# Patient Record
Sex: MALE | Race: WHITE | NOT HISPANIC OR LATINO
[De-identification: names, ages, dates, MRNs, and addresses within clinical notes are randomized per-mention and may not be internally consistent; named-entity substitution may affect disease eponyms.]

---

## 2021-02-16 ENCOUNTER — APPOINTMENT (OUTPATIENT)
Dept: MRI IMAGING | Facility: CLINIC | Age: 61
End: 2021-02-16
Payer: COMMERCIAL

## 2021-02-16 PROCEDURE — 70553 MRI BRAIN STEM W/O & W/DYE: CPT

## 2021-02-16 PROCEDURE — A9585A: CUSTOM

## 2021-02-16 PROCEDURE — A9585: CPT | Mod: JW

## 2021-05-07 PROBLEM — Z00.00 ENCOUNTER FOR PREVENTIVE HEALTH EXAMINATION: Status: ACTIVE | Noted: 2021-05-07

## 2021-05-28 ENCOUNTER — APPOINTMENT (OUTPATIENT)
Dept: CARDIOLOGY | Facility: CLINIC | Age: 61
End: 2021-05-28
Payer: COMMERCIAL

## 2021-05-28 ENCOUNTER — NON-APPOINTMENT (OUTPATIENT)
Age: 61
End: 2021-05-28

## 2021-05-28 VITALS
TEMPERATURE: 97.6 F | BODY MASS INDEX: 31.1 KG/M2 | WEIGHT: 210 LBS | SYSTOLIC BLOOD PRESSURE: 134 MMHG | HEART RATE: 84 BPM | DIASTOLIC BLOOD PRESSURE: 90 MMHG | RESPIRATION RATE: 18 BRPM | HEIGHT: 69 IN | OXYGEN SATURATION: 96 %

## 2021-05-28 DIAGNOSIS — Z78.9 OTHER SPECIFIED HEALTH STATUS: ICD-10-CM

## 2021-05-28 DIAGNOSIS — F41.9 ANXIETY DISORDER, UNSPECIFIED: ICD-10-CM

## 2021-05-28 DIAGNOSIS — Z82.5 FAMILY HISTORY OF ASTHMA AND OTHER CHRONIC LOWER RESPIRATORY DISEASES: ICD-10-CM

## 2021-05-28 DIAGNOSIS — Z82.49 FAMILY HISTORY OF ISCHEMIC HEART DISEASE AND OTHER DISEASES OF THE CIRCULATORY SYSTEM: ICD-10-CM

## 2021-05-28 PROCEDURE — 93000 ELECTROCARDIOGRAM COMPLETE: CPT

## 2021-05-28 PROCEDURE — 99072 ADDL SUPL MATRL&STAF TM PHE: CPT

## 2021-05-28 PROCEDURE — 99204 OFFICE O/P NEW MOD 45 MIN: CPT

## 2021-05-28 RX ORDER — MELOXICAM 15 MG/1
15 TABLET ORAL
Refills: 0 | Status: DISCONTINUED | COMMUNITY
End: 2021-05-28

## 2021-05-28 NOTE — ASSESSMENT
[FreeTextEntry1] : \par 61-year-old male with history of hyperlipidemia likely familial, elevated blood pressure here to establish outpatient CV care due to uncontrolled hyperlipidemia.\par \par He reports trial of Lipitor and Crestor with Zetia causing joint pains, abdominal pain, right forearm pain.  Had not tried statin without Zetia present at the same time.  \par \par \par PLAN:\par -Start aspirin 81, atorvastatin 20, fenofibrate 145.  Recommend PPI, he will discuss again at follow-up.  Stop NSAIDs.\par -TTE and TST ordered\par -Maintain blood pressure log, will possibly need pharmacologic initiation and results follow-up visit\par -Discussed home sleep study, will rediscuss at follow-up visit.  No snoring.\par -We will repeat lab work in 3 months including A1c\par \par \par Follow up after testing.\par

## 2021-05-28 NOTE — HISTORY OF PRESENT ILLNESS
[FreeTextEntry1] : \par 61-year-old male with history of hyperlipidemia likely familial, elevated blood pressure here to establish outpatient CV care due to uncontrolled hyperlipidemia.\par \par He reports trial of Lipitor and Crestor with Zetia causing joint pains, abdominal pain, right forearm pain.  Had not tried statin without Zetia present at the same time.  Denies angina or dyspnea.  Active.  PND, lower extremity edema.\par \par Blood pressure reading elevated today and in PCP office around 130s over 80s to low 90s.  Does not report prior hypertension, will keep blood pressure log at home until results follow-up to discuss Initiating medication therapy.\par \par \par Lab work 4/27/2021: Grossly normal BMP.  Total cholesterol 340, triglycerides 253, HDL 55, .  CBC 1/20/2021: Grossly normal.  LFTs normal 1/28/2021: TFT normal then too.

## 2021-05-28 NOTE — PHYSICAL EXAM
[Well Developed] : well developed [Well Nourished] : well nourished [No Acute Distress] : no acute distress [Normal Conjunctiva] : normal conjunctiva [Normal Venous Pressure] : normal venous pressure [Normal S1, S2] : normal S1, S2 [No Rub] : no rub [No Gallop] : no gallop [Clear Lung Fields] : clear lung fields [Good Air Entry] : good air entry [No Respiratory Distress] : no respiratory distress  [Soft] : abdomen soft [Non Tender] : non-tender [No Masses/organomegaly] : no masses/organomegaly [Normal Bowel Sounds] : normal bowel sounds [Normal Gait] : normal gait [No Edema] : no edema [No Cyanosis] : no cyanosis [No Clubbing] : no clubbing [No Varicosities] : no varicosities [No Rash] : no rash [No Skin Lesions] : no skin lesions [Moves all extremities] : moves all extremities [No Focal Deficits] : no focal deficits [Normal Speech] : normal speech [Alert and Oriented] : alert and oriented [Normal memory] : normal memory

## 2021-06-10 ENCOUNTER — APPOINTMENT (OUTPATIENT)
Dept: CARDIOLOGY | Facility: CLINIC | Age: 61
End: 2021-06-10
Payer: COMMERCIAL

## 2021-06-10 PROCEDURE — 93306 TTE W/DOPPLER COMPLETE: CPT

## 2021-06-10 PROCEDURE — 93015 CV STRESS TEST SUPVJ I&R: CPT

## 2021-06-10 PROCEDURE — 99072 ADDL SUPL MATRL&STAF TM PHE: CPT

## 2021-07-06 ENCOUNTER — APPOINTMENT (OUTPATIENT)
Dept: CARDIOLOGY | Facility: CLINIC | Age: 61
End: 2021-07-06
Payer: COMMERCIAL

## 2021-07-06 VITALS
HEART RATE: 70 BPM | DIASTOLIC BLOOD PRESSURE: 80 MMHG | TEMPERATURE: 97.3 F | WEIGHT: 210 LBS | SYSTOLIC BLOOD PRESSURE: 130 MMHG | HEIGHT: 69 IN | OXYGEN SATURATION: 95 % | BODY MASS INDEX: 31.1 KG/M2

## 2021-07-06 PROCEDURE — 99072 ADDL SUPL MATRL&STAF TM PHE: CPT

## 2021-07-06 PROCEDURE — 99214 OFFICE O/P EST MOD 30 MIN: CPT

## 2021-07-06 NOTE — HISTORY OF PRESENT ILLNESS
[FreeTextEntry1] : \par 61-year-old male with history of hyperlipidemia likely familial, elevated blood pressure here for results follow up and bp.\par \par On last visit, he reported trial of Lipitor and Crestor with Zetia causing joint pains, abdominal pain, right forearm pain. Had not tried statin without Zetia present at the same time. Plan was to stop nsaid and keep BP log started meds.\par \par since last visit, compliant with medications. tolerated statin/feno therapy off zetia. Patient denies chest pain, shortness of breath, orthopnea, PND, LE edema, syncope, near syncope. did not keep bp log yet. testing done.\par \par \par TST 6/21: 10 mets, no ekg ischemia\par TTE 6/21: grossly unremarkable except calcified NCC no aortic stenosis. asc ao 4.1 normal when indexed to bsa\par Lab work 4/27/2021: Grossly normal BMP.  Total cholesterol 340, triglycerides 253, HDL 55, .  CBC 1/20/2021: Grossly normal.  LFTs normal 1/28/2021: TFT normal then too.

## 2021-07-06 NOTE — ASSESSMENT
[FreeTextEntry1] : \par 61-year-old male with history of hyperlipidemia likely familial, elevated blood pressure here for results follow up and bp. tolerated statin/feno therapy off zetia. did not keep bp log yet. testing done.\par \par \par PLAN:\par - continue recently started aspirin 81 and fenofibrate 145. INCREASED atorva to 40 (goal will be 80). Recommend PPI, he will discuss again at follow-up.  Stopped NSAIDs.\par - We will repeat lab work in 3 months including A1c preclinic.\par - Maintain blood pressure log, will possibly need pharmacologic initiation at 3 month visit.\par - Discussed home sleep study, will rediscuss at follow-up visit.  No snoring.\par \par \par Follow up in 3 months preclinic labwork. bp log. ER precautions given to patient.\par \par

## 2021-10-06 ENCOUNTER — APPOINTMENT (OUTPATIENT)
Dept: CARDIOLOGY | Facility: CLINIC | Age: 61
End: 2021-10-06
Payer: COMMERCIAL

## 2021-10-06 VITALS
BODY MASS INDEX: 31.1 KG/M2 | HEIGHT: 69 IN | OXYGEN SATURATION: 96 % | WEIGHT: 210 LBS | HEART RATE: 71 BPM | RESPIRATION RATE: 18 BRPM | DIASTOLIC BLOOD PRESSURE: 80 MMHG | TEMPERATURE: 97.6 F | SYSTOLIC BLOOD PRESSURE: 132 MMHG

## 2021-10-06 PROCEDURE — 99214 OFFICE O/P EST MOD 30 MIN: CPT

## 2021-10-06 NOTE — ASSESSMENT
[FreeTextEntry1] : \par 61-year-old male with history of hyperlipidemia likely familial, elevated blood pressure here for follow up. tolerated statin/feno therapy off zetia. no active cv symptoms. home BP log 120s/80s.\par \par \par PLAN:\par - obtain repeat lipid profile/a1c/tft\par - continue the aspirin 81 and fenofibrate 145 and the atorva 40 (goal will be 80). Recommend PPI. Stopped NSAIDs.\par - Maintain blood pressure log. call if >130/80 average.\par - Discussed home sleep study, will re-discuss at future visits. No snoring.\par \par \par Follow up in 6 months. Call him with labwork results and I will forward the results to PCP, Dr. Sarah. Interim bp log. ER precautions given to patient.\par \par

## 2021-10-06 NOTE — PHYSICAL EXAM
[Well Developed] : well developed [Well Nourished] : well nourished [No Acute Distress] : no acute distress [Normal Conjunctiva] : normal conjunctiva [Normal Venous Pressure] : normal venous pressure [Normal S1, S2] : normal S1, S2 [No Rub] : no rub [No Gallop] : no gallop [Clear Lung Fields] : clear lung fields [Good Air Entry] : good air entry [No Respiratory Distress] : no respiratory distress  [Soft] : abdomen soft [Non Tender] : non-tender [Normal Bowel Sounds] : normal bowel sounds [Normal Gait] : normal gait [No Edema] : no edema [No Cyanosis] : no cyanosis [No Clubbing] : no clubbing [No Varicosities] : no varicosities [No Rash] : no rash [No Skin Lesions] : no skin lesions [Moves all extremities] : moves all extremities [No Focal Deficits] : no focal deficits [Normal Speech] : normal speech [Alert and Oriented] : alert and oriented [Normal memory] : normal memory

## 2021-10-06 NOTE — HISTORY OF PRESENT ILLNESS
[FreeTextEntry1] : \par 61-year-old male with history of hyperlipidemia likely familial, elevated blood pressure here for follow up.\par \par At last visit, he had tolerated statin/feno therapy off zetia. no active cv symptoms. \par home BP log 120s/80s.\par \par ankle sprain seeing podiatrist. fatigue.\par \par \par Prior: trials of Lipitor and Crestor with Zetia causing joint pains, abdominal pain, right forearm pain. Had not tried statin without Zetia present at the same time. Plan was to stop nsaid and keep BP log started meds.\par \par \par TESTING:\par Labwork 8/2021: grossly normal cbc/cmp\par TST 6/21: 10 mets, no ekg ischemia\par TTE 6/21: grossly unremarkable except calcified NCC no aortic stenosis. asc ao 4.1 normal when indexed to bsa\par Lab work 4/27/2021: Grossly normal BMP.  Total cholesterol 340, triglycerides 253, HDL 55, .  CBC 1/20/2021: Grossly normal.  LFTs normal 1/28/2021: TFT normal then too.

## 2022-04-20 ENCOUNTER — APPOINTMENT (OUTPATIENT)
Dept: CARDIOLOGY | Facility: CLINIC | Age: 62
End: 2022-04-20
Payer: COMMERCIAL

## 2022-04-20 VITALS
SYSTOLIC BLOOD PRESSURE: 134 MMHG | HEIGHT: 69 IN | DIASTOLIC BLOOD PRESSURE: 90 MMHG | OXYGEN SATURATION: 95 % | TEMPERATURE: 97.7 F | HEART RATE: 71 BPM | BODY MASS INDEX: 31.7 KG/M2 | WEIGHT: 214 LBS

## 2022-04-20 PROCEDURE — 99213 OFFICE O/P EST LOW 20 MIN: CPT

## 2022-04-20 RX ORDER — ASPIRIN 81 MG/1
81 TABLET ORAL DAILY
Qty: 90 | Refills: 3 | Status: DISCONTINUED | COMMUNITY
Start: 2021-05-28 | End: 2022-04-20

## 2022-04-20 RX ORDER — ESCITALOPRAM OXALATE 5 MG/1
5 TABLET, FILM COATED ORAL DAILY
Refills: 0 | Status: ACTIVE | COMMUNITY

## 2022-04-20 RX ORDER — ATORVASTATIN CALCIUM 40 MG/1
40 TABLET, FILM COATED ORAL
Qty: 90 | Refills: 3 | Status: DISCONTINUED | COMMUNITY
Start: 2021-05-28 | End: 2022-04-20

## 2022-04-20 RX ORDER — FENOFIBRATE 145 MG/1
145 TABLET, COATED ORAL DAILY
Qty: 90 | Refills: 1 | Status: DISCONTINUED | COMMUNITY
Start: 2021-05-28 | End: 2022-04-20

## 2022-04-20 NOTE — DISCUSSION/SUMMARY
[FreeTextEntry1] : \par \par 62-year-old male with history of hyperlipidemia likely familial, elevated blood pressure here for follow up. no active cv symptoms. home BP log 130s/80s.\par \par \par PLAN:\par - start amlodipine 5 mg daily. bp log  (rest 5 minutes, no caffeine prior hour, legs uncrossed, cuff at heart level). Goal <120/80. \par - obtain the cv labs from Dr. Sarah. off aspirin. did not tolerate statin/fenofibrate trials and now is on a trial drug. I discussed need for PCSK9i in future potentially.\par - Discussed home sleep study, will re-discuss at future visits.\par - screen 1st degree relatives for lipids\par \par \par Follow up in 1 year. I will forward the note to PCP, Dr. Sarah. Interim bp log. ER precautions given to patient.\par \par

## 2022-04-20 NOTE — HISTORY OF PRESENT ILLNESS
[FreeTextEntry1] : \par 62-year-old male with history of hyperlipidemia likely familial, HTN here for follow up.\par \par 4/2022 VISIT: feels well. moving now. stressful. did not tolerate statin or fenofibrate therapy. on a trial drug for cholesterol. ankle improved. \par \par 10/2021: At last visit, he had tolerated statin/feno therapy off zetia. no active cv symptoms. \par home BP log 120s/80s. ankle sprain seeing podiatrist. fatigue.\par \par Prior: trials of Lipitor and Crestor with Zetia causing joint pains, abdominal pain, right forearm pain. Had not tried statin without Zetia present at the same time. Plan was to stop nsaid and keep BP log started meds.\par \par \par TESTING:\par Labwork 8/2021: grossly normal cbc/cmp\par TST 6/21: 10 mets, no ekg ischemia\par TTE 6/21: grossly unremarkable except calcified NCC no aortic stenosis. asc ao 4.1 normal when indexed to bsa\par Lab work 4/27/2021: Grossly normal BMP.  Total cholesterol 340, triglycerides 253, HDL 55, .  CBC 1/20/2021: Grossly normal.  LFTs normal 1/28/2021: TFT normal then too.

## 2023-05-24 ENCOUNTER — NON-APPOINTMENT (OUTPATIENT)
Age: 63
End: 2023-05-24

## 2023-05-24 ENCOUNTER — APPOINTMENT (OUTPATIENT)
Dept: CARDIOLOGY | Facility: CLINIC | Age: 63
End: 2023-05-24
Payer: COMMERCIAL

## 2023-05-24 VITALS
BODY MASS INDEX: 31.4 KG/M2 | HEART RATE: 83 BPM | OXYGEN SATURATION: 96 % | HEIGHT: 69 IN | DIASTOLIC BLOOD PRESSURE: 74 MMHG | WEIGHT: 212 LBS | SYSTOLIC BLOOD PRESSURE: 102 MMHG

## 2023-05-24 PROCEDURE — 99214 OFFICE O/P EST MOD 30 MIN: CPT | Mod: 25

## 2023-05-24 PROCEDURE — 93000 ELECTROCARDIOGRAM COMPLETE: CPT

## 2023-05-24 RX ORDER — PRAVASTATIN SODIUM 10 MG/1
10 TABLET ORAL DAILY
Refills: 0 | Status: DISCONTINUED | COMMUNITY
End: 2023-05-24

## 2023-05-24 NOTE — DISCUSSION/SUMMARY
[FreeTextEntry1] : \par 63 year-old male with history of hyperlipidemia likely familial, elevated blood pressure here for follow up. no active cv symptoms. preDM2. \par \par Did not tolerate multiple statin trials nor fenofibrate or zetia. was on trial drug.\par \par - start PCSK9i to avoid these side effects \par - amlodipine 5 mg daily. bp log\par - Discussed home sleep study, will re-discuss at future visits.\par - screen 1st degree relatives for lipids\par \par Follow up in 3 months with serial labs. I will forward the note to PCP, Dr. Sarah. Interim bp log. ER precautions given to patient.\par

## 2023-05-24 NOTE — HISTORY OF PRESENT ILLNESS
[FreeTextEntry1] : \par 63-year-old male with history of hyperlipidemia likely familial, HTN here for follow up.\par \par 5/2023 visit: Feels well overall.  Unfortunately did not tolerate several lipid-lowering trials.  LDL increased now to 86. 2/20/2023 showed .  A1c 5.8 CBC CMP\par \par 4/2022 VISIT: feels well. moving now. stressful. did not tolerate statin or fenofibrate therapy. on a trial drug for cholesterol. ankle improved. \par \par 10/2021: At last visit, he had tolerated statin/feno therapy off zetia. no active cv symptoms. \par home BP log 120s/80s. ankle sprain seeing podiatrist. fatigue.\par \par Prior: trials of Lipitor and Crestor with Zetia causing joint pains, abdominal pain, right forearm pain. Had not tried statin without Zetia present at the same time. Plan was to stop nsaid and keep BP log started meds.\par \par \par TESTING: excluding above\par \par Labwork 8/2021: grossly normal cbc/cmp\par TST 6/21: 10 mets, no ekg ischemia\par TTE 6/21: grossly unremarkable except calcified NCC no aortic stenosis. asc ao 4.1 normal when indexed to bsa\par Lab work 4/27/2021: Grossly normal BMP.  Total cholesterol 340, triglycerides 253, HDL 55, .  CBC 1/20/2021: Grossly normal.  LFTs normal 1/28/2021: TFT normal then too.\par

## 2023-06-28 ENCOUNTER — NON-APPOINTMENT (OUTPATIENT)
Age: 63
End: 2023-06-28

## 2023-06-29 ENCOUNTER — NON-APPOINTMENT (OUTPATIENT)
Age: 63
End: 2023-06-29

## 2023-06-29 RX ORDER — ALIROCUMAB 75 MG/ML
75 INJECTION, SOLUTION SUBCUTANEOUS
Qty: 6 | Refills: 3 | Status: DISCONTINUED | COMMUNITY
Start: 2023-05-24 | End: 2023-06-29

## 2023-07-11 ENCOUNTER — NON-APPOINTMENT (OUTPATIENT)
Age: 63
End: 2023-07-11

## 2023-08-21 ENCOUNTER — APPOINTMENT (OUTPATIENT)
Dept: CARDIOLOGY | Facility: CLINIC | Age: 63
End: 2023-08-21
Payer: COMMERCIAL

## 2023-08-21 VITALS
WEIGHT: 212 LBS | BODY MASS INDEX: 31.4 KG/M2 | HEIGHT: 69 IN | SYSTOLIC BLOOD PRESSURE: 110 MMHG | OXYGEN SATURATION: 97 % | HEART RATE: 61 BPM | DIASTOLIC BLOOD PRESSURE: 74 MMHG

## 2023-08-21 DIAGNOSIS — E78.5 HYPERLIPIDEMIA, UNSPECIFIED: ICD-10-CM

## 2023-08-21 PROCEDURE — 99214 OFFICE O/P EST MOD 30 MIN: CPT

## 2023-08-21 NOTE — DISCUSSION/SUMMARY
[FreeTextEntry1] : 63 year-old male with history of hyperlipidemia likely familial, elevated blood pressure here for follow up. no active cv symptoms. preDM2. Did not tolerate multiple statin trials nor fenofibrate or zetia. was on trial drug.  - started PCSK9i. labwork ordered in a few months. see him in December before he goes to [place] for 3 months.  - amlodipine 5 mg daily. bp log - Discussed home sleep study, will re-discuss at future visits. - screen 1st degree relatives for lipids - I will forward the note to PCP, Dr. Sarah. Interim bp log. ER precautions given to patient.

## 2023-08-21 NOTE — HISTORY OF PRESENT ILLNESS
[FreeTextEntry1] : 63-year-old male with history of hyperlipidemia likely familial, HTN here for follow up.  8/2023 visit: on pcsk9i now. no cv symptoms.   5/2023 visit: Feels well overall.  Unfortunately, did not tolerate several lipid-lowering trials.  LDL increased now. 2/20/2023 showed .  A1c 5.8 CBC CMP  4/2022 VISIT: feels well. moving now. stressful. did not tolerate statin or fenofibrate therapy. on a trial drug for cholesterol. ankle improved.   10/2021: At last visit, he had tolerated statin/feno therapy off zetia. no active cv symptoms.  home BP log 120s/80s. ankle sprain seeing podiatrist. fatigue.  Prior: trials of Lipitor and Crestor with Zetia causing joint pains, abdominal pain, right forearm pain. Had not tried statin without Zetia present at the same time. Plan was to stop nsaid and keep BP log started meds.   TESTING: excluding above  Labwork 8/2021: grossly normal cbc/cmp TST 6/21: 10 mets, no ekg ischemia TTE 6/21: grossly unremarkable except calcified NCC no aortic stenosis. asc ao 4.1 normal when indexed to bsa Lab work 4/27/2021: Grossly normal BMP.  Total cholesterol 340, triglycerides 253, HDL 55, .  CBC 1/20/2021: Grossly normal.  LFTs normal 1/28/2021: TFT normal then too.

## 2023-09-04 NOTE — REVIEW OF SYSTEMS
Onset: two     Location / description: Patient had an ablation last week, had a complication, was an inpatient for 4 days.   Left arm, muscle spasm, regular but not constant. Not painful. Internet said muscle tremors   Low on magnesium??  Started yesterday. Started Amiodarone on Wednesday last week.     96%  60 125/88. Feels great.       Precipitating Factors: ablation on 8/28/2023    Pain Scale (1-10), 10 highest: 0/10    What  improves / worsens symptoms: patient thinks that the Amiodarone is causing the tremors.     Symptom specific medications: amiodarone--400 tid for 2 days, then BID for 7 days and then one per day.       Recent visits (last 3-4 weeks) for same reason or recent surgery:  8/28/2023    PLAN:  Paged Provider, Dr. Pop calls back, he is given report and he would like the patient to take Amiodarone 200 mg one time per day and then check in with Dr. Flores early this week.   This is relayed to the patient, he agrees.       Patient/Caller agrees to follow recommendations.    Reason for Disposition  • [1] Caller has URGENT medicine question about med that PCP or specialist prescribed AND [2] triager unable to answer question    Protocols used: MEDICATION QUESTION CALL-A-       [Negative] : Heme/Lymph

## 2023-12-07 ENCOUNTER — NON-APPOINTMENT (OUTPATIENT)
Age: 63
End: 2023-12-07

## 2023-12-14 PROBLEM — E78.49 FAMILIAL HYPERLIPIDEMIA: Status: ACTIVE | Noted: 2021-05-28

## 2023-12-14 PROBLEM — I10 BENIGN ESSENTIAL HYPERTENSION: Status: ACTIVE | Noted: 2021-05-28

## 2023-12-20 ENCOUNTER — APPOINTMENT (OUTPATIENT)
Dept: CARDIOLOGY | Facility: CLINIC | Age: 63
End: 2023-12-20
Payer: COMMERCIAL

## 2023-12-20 VITALS
WEIGHT: 215 LBS | HEART RATE: 64 BPM | DIASTOLIC BLOOD PRESSURE: 72 MMHG | SYSTOLIC BLOOD PRESSURE: 116 MMHG | HEIGHT: 69 IN | OXYGEN SATURATION: 96 % | BODY MASS INDEX: 31.84 KG/M2

## 2023-12-20 DIAGNOSIS — E78.49 OTHER HYPERLIPIDEMIA: ICD-10-CM

## 2023-12-20 DIAGNOSIS — I10 ESSENTIAL (PRIMARY) HYPERTENSION: ICD-10-CM

## 2023-12-20 PROCEDURE — 99214 OFFICE O/P EST MOD 30 MIN: CPT

## 2023-12-20 NOTE — HISTORY OF PRESENT ILLNESS
[FreeTextEntry1] : 63-year-old male with history of hyperlipidemia likely familial on PCSK9i, HTN here for follow up.   12/2023 VISIT: his LDL improved to 79 from 195. asymptomatic.   8/2023 visit: on pcsk9i now. no cv symptoms.   5/2023 visit: Feels well overall.  Unfortunately, did not tolerate several lipid-lowering trials.  LDL increased now. 2/20/2023 showed .  A1c 5.8 CBC CMP  4/2022 VISIT: feels well. moving now. stressful. did not tolerate statin or fenofibrate therapy. on a trial drug for cholesterol. ankle improved.   10/2021: At last visit, he had tolerated statin/feno therapy off zetia. no active cv symptoms.  home BP log 120s/80s. ankle sprain seeing podiatrist. fatigue.  Prior: trials of Lipitor and Crestor with Zetia causing joint pains, abdominal pain, right forearm pain. Had not tried statin without Zetia present at the same time. Plan was to stop nsaid and keep BP log started meds.   TESTING I REVIEWED TODAY excluding above:  Labwork 8/2021: grossly normal cbc/cmp TST 6/21: 10 mets, no ekg ischemia TTE 6/21: grossly unremarkable except calcified NCC no aortic stenosis. asc ao 4.1 normal when indexed to bsa Lab work 4/27/2021: Grossly normal BMP.  Total cholesterol 340, triglycerides 253, HDL 55, .  CBC 1/20/2021: Grossly normal.  LFTs normal 1/28/2021: TFT normal then too.

## 2023-12-20 NOTE — DISCUSSION/SUMMARY
[FreeTextEntry1] : 63-year-old male with history of hyperlipidemia likely familial on PCSK9i, HTN, preDM2 here for follow up.  With his likely familial HLD, he will maintain PCSK9i. The LDL is so much better from 283 peak now 79. I feel good abotu this. I recommend he has his first degree relatives screened. Monitor labwork on PCSK9i.   Maintain bp optimization. keep a log. I Discussed home sleep study, will re-discuss at future visits.  See me in 1 year. I will forward the note to PCP, Dr. Sarah. He is going to [place] for 3 months.  ER precautions given to patient.

## 2023-12-20 NOTE — REASON FOR VISIT
[Symptom and Test Evaluation] : symptom and test evaluation [Hyperlipidemia] : hyperlipidemia [Hypertension] : hypertension [FreeTextEntry3] : Dr. Barby Sarah

## 2023-12-21 RX ORDER — AMLODIPINE BESYLATE 5 MG/1
5 TABLET ORAL DAILY
Qty: 90 | Refills: 3 | Status: ACTIVE | COMMUNITY
Start: 2022-04-20 | End: 1900-01-01

## 2024-05-14 RX ORDER — EVOLOCUMAB 140 MG/ML
140 INJECTION, SOLUTION SUBCUTANEOUS
Qty: 6 | Refills: 1 | Status: ACTIVE | COMMUNITY
Start: 2023-06-29 | End: 1900-01-01

## 2024-12-16 LAB — HBA1C MFR BLD HPLC: 5.7

## 2024-12-17 ENCOUNTER — NON-APPOINTMENT (OUTPATIENT)
Age: 64
End: 2024-12-17

## 2024-12-17 ENCOUNTER — APPOINTMENT (OUTPATIENT)
Dept: CARDIOLOGY | Facility: CLINIC | Age: 64
End: 2024-12-17
Payer: COMMERCIAL

## 2024-12-17 VITALS
HEIGHT: 69 IN | DIASTOLIC BLOOD PRESSURE: 70 MMHG | WEIGHT: 216 LBS | OXYGEN SATURATION: 96 % | SYSTOLIC BLOOD PRESSURE: 118 MMHG | HEART RATE: 67 BPM | BODY MASS INDEX: 31.99 KG/M2

## 2024-12-17 DIAGNOSIS — E78.49 OTHER HYPERLIPIDEMIA: ICD-10-CM

## 2024-12-17 DIAGNOSIS — E78.5 HYPERLIPIDEMIA, UNSPECIFIED: ICD-10-CM

## 2024-12-17 DIAGNOSIS — I45.2 BIFASCICULAR BLOCK: ICD-10-CM

## 2024-12-17 DIAGNOSIS — I10 ESSENTIAL (PRIMARY) HYPERTENSION: ICD-10-CM

## 2024-12-17 PROCEDURE — 99214 OFFICE O/P EST MOD 30 MIN: CPT

## 2024-12-17 PROCEDURE — 93000 ELECTROCARDIOGRAM COMPLETE: CPT

## 2025-04-30 ENCOUNTER — NON-APPOINTMENT (OUTPATIENT)
Age: 65
End: 2025-04-30

## 2025-08-29 ENCOUNTER — RX RENEWAL (OUTPATIENT)
Age: 65
End: 2025-08-29